# Patient Record
Sex: MALE
[De-identification: names, ages, dates, MRNs, and addresses within clinical notes are randomized per-mention and may not be internally consistent; named-entity substitution may affect disease eponyms.]

---

## 2019-04-04 PROBLEM — Z00.00 ENCOUNTER FOR PREVENTIVE HEALTH EXAMINATION: Status: ACTIVE | Noted: 2019-04-04

## 2019-04-05 ENCOUNTER — APPOINTMENT (OUTPATIENT)
Dept: OTOLARYNGOLOGY | Facility: CLINIC | Age: 29
End: 2019-04-05
Payer: COMMERCIAL

## 2019-04-05 VITALS
DIASTOLIC BLOOD PRESSURE: 60 MMHG | WEIGHT: 180 LBS | HEART RATE: 73 BPM | SYSTOLIC BLOOD PRESSURE: 110 MMHG | HEIGHT: 67 IN | BODY MASS INDEX: 28.25 KG/M2

## 2019-04-05 DIAGNOSIS — Z82.5 FAMILY HISTORY OF ASTHMA AND OTHER CHRONIC LOWER RESPIRATORY DISEASES: ICD-10-CM

## 2019-04-05 DIAGNOSIS — Z80.0 FAMILY HISTORY OF MALIGNANT NEOPLASM OF DIGESTIVE ORGANS: ICD-10-CM

## 2019-04-05 DIAGNOSIS — H61.23 IMPACTED CERUMEN, BILATERAL: ICD-10-CM

## 2019-04-05 PROCEDURE — 99203 OFFICE O/P NEW LOW 30 MIN: CPT | Mod: 25

## 2019-04-05 PROCEDURE — 31575 DIAGNOSTIC LARYNGOSCOPY: CPT

## 2019-04-05 PROCEDURE — 69210 REMOVE IMPACTED EAR WAX UNI: CPT

## 2019-04-05 RX ORDER — FAMOTIDINE 40 MG/1
40 TABLET, FILM COATED ORAL TWICE DAILY
Qty: 60 | Refills: 1 | Status: ACTIVE | COMMUNITY
Start: 2019-04-05 | End: 1900-01-01

## 2019-04-05 RX ORDER — FAMOTIDINE 40 MG/1
40 TABLET, FILM COATED ORAL
Refills: 0 | Status: ACTIVE | COMMUNITY

## 2019-04-05 NOTE — HISTORY OF PRESENT ILLNESS
[de-identified] : Pranav Plasencia has a history of reflux. He was diagnosed about 4-5 years ago. He frequently has a cough, postnasal drip, intermittent dysphagia, throat clearing, and hoarseness. He takes Pepcid. He does not really follow dietary precautions. He does not sleep with the head elevated at night. He is followed by the gastroenterologist. He does not smoke. He does not typically have allergies although he has not been tested

## 2019-04-05 NOTE — ASSESSMENT
[FreeTextEntry1] : He is a history of reflux. He is on Pepcid. He still has her symptoms. He had reflux-related laryngeal changes on flexible laryngoscopy. 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 \par - Reflux precautions and elevation of the head of bed at night.  Literature given.\par - Continue Pepcid.\par - He could consider the [employer]. I asked him to speak with the gastroenterologist to ensure there is no contraindication\par -Consider pH probe testing while on medication if he has persistent symptoms\par - Allergy medication as needed \par - Consider Allergy evaluation\par -good aural hygiene\par -avoid using cotton swabs in the ears\par -noise precautions., I recommended musician's earplugs\par -I will see if he would like an audiogram when he returns. \par - follow up in 4 weeks. \par - call and return earlier if any concerns

## 2020-05-07 ENCOUNTER — APPOINTMENT (OUTPATIENT)
Dept: OTOLARYNGOLOGY | Facility: CLINIC | Age: 30
End: 2020-05-07
Payer: COMMERCIAL

## 2020-05-07 VITALS — TEMPERATURE: 96.5 F

## 2020-05-07 DIAGNOSIS — H69.81 OTHER SPECIFIED DISORDERS OF EUSTACHIAN TUBE, RIGHT EAR: ICD-10-CM

## 2020-05-07 DIAGNOSIS — H91.91 UNSPECIFIED HEARING LOSS, RIGHT EAR: ICD-10-CM

## 2020-05-07 DIAGNOSIS — K21.9 GASTRO-ESOPHAGEAL REFLUX DISEASE W/OUT ESOPHAGITIS: ICD-10-CM

## 2020-05-07 PROCEDURE — 99213 OFFICE O/P EST LOW 20 MIN: CPT

## 2020-05-07 PROCEDURE — 92557 COMPREHENSIVE HEARING TEST: CPT

## 2020-05-07 PROCEDURE — 92550 TYMPANOMETRY & REFLEX THRESH: CPT

## 2020-05-07 NOTE — HISTORY OF PRESENT ILLNESS
[de-identified] : 4/5/19- Pranav Plasencia has a history of reflux. He was diagnosed about 4-5 years ago. He frequently has a cough, postnasal drip, intermittent dysphagia, throat clearing, and hoarseness. He takes Pepcid. He does not really follow dietary precautions. He does not sleep with the head elevated at night. He is followed by the gastroenterologist. He does not smoke. He does not typically have allergies although he has not been tested\par  [FreeTextEntry1] : \par 5/7/20- JP GREWAL Is here for a two-week history of right ear fullness and abnormal auditory perception. He had pressure in the ear with hearing loss. He also had mild ear pain. He had no otorrhea, tinnitus, or dizziness. He was not sick. He did not have loss of smell, throat pain or cough. He does have a mild postnasal drip and congestion which is chronic. He saw an ENT physician last week and had his ear cleaned. He still feels like there is fullness in the ear. The pain resolved. He has no history of recurrent ear infections or prior otologic surgery. He has reflux. It has been controlled. He is followed by a gastroenterologist. He does not think that he is grinding  his teeth. He has no throat pain.  Precautions and recommendations for office visits per Judi were followed during the visit.

## 2020-05-07 NOTE — ASSESSMENT
[FreeTextEntry1] : He has had right ear pressure. He had pain but that resolved when he had his ears cleaned.  On exam today, his ear exam and audiogram are normal. I discussed possible etiologies such as eustachian tube dysfunction due to allergies, reflux, or other sinus conditions and TMJ dysfunction.\par \par PLAN\par \par -findings and management options discussed in detail with the patient. \par - continue treatment for reflux with medication as needed, reflux precautions and elevation of the head of bed at night.   \par - Allergy medication as needed. He may try an antihistamine or decongestant. He may also try a nasal steroid spray. I mentioned some of the concerns regarding use of the nasal sprays during the COVID outbreak. \par -good aural hygiene\par -avoid using cotton swabs in the ears\par -noise precautions \par -we are going to see how he does over the next 2-3 weeks. If he is not feeling better, he will return and we will discuss further workup. I would also perform repeat NE/FL. \par - call and return earlier if any concerns or problems